# Patient Record
Sex: MALE | Race: WHITE
[De-identification: names, ages, dates, MRNs, and addresses within clinical notes are randomized per-mention and may not be internally consistent; named-entity substitution may affect disease eponyms.]

---

## 2019-10-28 ENCOUNTER — HOSPITAL ENCOUNTER (EMERGENCY)
Dept: HOSPITAL 25 - UCEAST | Age: 19
Discharge: HOME | End: 2019-10-28
Payer: COMMERCIAL

## 2019-10-28 VITALS — SYSTOLIC BLOOD PRESSURE: 132 MMHG | DIASTOLIC BLOOD PRESSURE: 82 MMHG

## 2019-10-28 DIAGNOSIS — H60.02: Primary | ICD-10-CM

## 2019-10-28 PROCEDURE — 87205 SMEAR GRAM STAIN: CPT

## 2019-10-28 PROCEDURE — 87070 CULTURE OTHR SPECIMN AEROBIC: CPT

## 2019-10-28 PROCEDURE — G0463 HOSPITAL OUTPT CLINIC VISIT: HCPCS

## 2019-10-28 PROCEDURE — 10060 I&D ABSCESS SIMPLE/SINGLE: CPT

## 2019-10-28 PROCEDURE — 99202 OFFICE O/P NEW SF 15 MIN: CPT

## 2019-10-28 NOTE — UC
Skin Complaint HPI





- HPI Summary


HPI Summary: 


18-year-old male comes in with a chief complaint of an infection in his left 

earlobe.'s been going on for quite some time it's gotten bigger recently.  The 

past he's had one other episode which was able to drain on its own however area 

is not been able to drain this one.  No fevers no chills.  Feels well 

otherwise.  The earlobe is tender to palpation.





- History of Current Complaint


Chief Complaint: UCSkin


Time Seen by Provider: 10/28/19 22:17


Stated Complaint: EAR LOBE IRRITATION


Pain Intensity: 6





- Allergy/Home Medications


Allergies/Adverse Reactions: 


 Allergies











Allergy/AdvReac Type Severity Reaction Status Date / Time


 


No Known Allergies Allergy   Verified 10/28/19 20:35











Home Medications: 


 Home Medications





Methylphenidate ER [Concerta] 27 mg PO DAILY 10/28/19 [History Confirmed 10/28/

19]


Sertraline* [Zoloft*] 75 mg PO DAILY 10/28/19 [History Confirmed 10/28/19]


guanFACINE TAB* [Tenex TAB*] 4 mg PO DAILY 10/28/19 [History Confirmed 10/28/19]











PMH/Surg Hx/FS Hx/Imm Hx


Previously Healthy: Yes





- Surgical History


Surgical History: None





- Family History


Known Family History: Positive: Non-Contributory





- Social History


Alcohol Use: None


Substance Use Type: None


Smoking Status (MU): Never Smoked Tobacco





Review of Systems


All Other Systems Reviewed And Are Negative: Yes


Constitutional: Positive: Negative


Skin: Positive: Other - SEE HPI


Eyes: Positive: Negative


ENT: Positive: Negative


Respiratory: Positive: Negative


Cardiovascular: Positive: Negative


Gastrointestinal: Positive: Negative


Motor: Positive: Negative


Neurovascular: Positive: Negative


Musculoskeletal: Positive: Negative


Neurological: Positive: Negative


Psychological: Positive: Negative


Is Patient Immunocompromised?: No





Physical Exam


Triage Information Reviewed: Yes


Appearance: Well-Appearing, No Pain Distress, Well-Nourished


Vital Signs: 


 Initial Vital Signs











Temp  98.4 F   10/28/19 20:29


 


Pulse  73   10/28/19 20:29


 


Resp  16   10/28/19 20:29


 


BP  132/82   10/28/19 20:29


 


Pulse Ox  100   10/28/19 20:29











Vital Signs Reviewed: Yes


Eye Exam: Normal


Eyes: Positive: Conjunctiva Clear


ENT: Positive: Other - The left earlobe has a 1.5 cm diameter fluctuant area 

with erythema..  Negative: Nasal drainage


Neck: Positive: Supple


Respiratory: Positive: No respiratory distress


Musculoskeletal: Positive: Strength Intact, ROM Intact


Neurological: Positive: Alert, Muscle Tone Normal


Psychological: Positive: Age Appropriate Behavior


Skin: Positive: Other - Left ear lobe swelling with erythema.





Procedures





- Incision and Drainage


  ** Left Ear


Site: left pinna earlobe


Anesthesia: Local, Lidocaine - 1%


Instrument(s): Scalpel - pus drained from the abscess and a culture was 

obtained.  No packing.





Course/Dx





- Course


Course Of Treatment: 





Pus to drain from the abscess of the left earlobe.  I did obtain a culture.  

Starting the patient on doxycycline 100 mg by mouth twice a day.  Follow-up 

with his primary care doctor get reevaluated sooner if worse or any questions 

or concerns.





- Diagnoses


Provider Diagnosis: 


 Abscess of left pinna








Discharge ED





- Sign-Out/Discharge


Documenting (check all that apply): Patient Departure


All imaging exams completed and their final reports reviewed: No Studies





- Discharge Plan


Condition: Stable


Disposition: HOME


Prescriptions: 


DOXYcycline CAP(*) [DOXYcycline 100MG CAP(*)] 100 mg PO BID #18 cap


Patient Education Materials:  Abscess (ED)


Referrals: 


[facility] [Provider Group]


Additional Instructions: 


FOLLOW UP WITH YOUR DOCTOR IF NOT COMPLETELY IMPROVED.


GET RECHECKED SOONER IF YOUR CONDITION WORSENS OR ANY QUESTIONS OR CONCERNS.





- Billing Disposition and Condition


Condition: STABLE


Disposition: Home